# Patient Record
Sex: FEMALE | Race: WHITE | NOT HISPANIC OR LATINO | URBAN - METROPOLITAN AREA
[De-identification: names, ages, dates, MRNs, and addresses within clinical notes are randomized per-mention and may not be internally consistent; named-entity substitution may affect disease eponyms.]

---

## 2024-06-13 ENCOUNTER — APPOINTMENT (OUTPATIENT)
Dept: OBSTETRICS AND GYNECOLOGY | Facility: CLINIC | Age: 49
End: 2024-06-13
Payer: COMMERCIAL

## 2024-07-08 NOTE — PROGRESS NOTES
ASSESSMENT/PLAN    1. Encounter for well woman exam with routine gynecological exam  Routine well woman visit.   Your exam was normal today.   A pap and HPV test was done. If you are connected with the  on line system, you will be notified about your pap results through the patient portal in 2-3 weeks.      2. Encounter for screening mammogram for breast cancer  A screening mammogram requisition has been placed.   Please schedule your mammogram     3. Check FSH, menopausal status.  If menopausal will remove IUD.     SUBJECTIVE    PAP 9- ASCUS HPV NEG  MAMM 2- with ultrasound  DEXA NEVER  COLON 7-    HPI  48 yo for routine well woman visit.   Last visit 2017.   Last menses unsure.  Has Mirena IUD that I placed but is unsure of the year placed. No menopausal symptoms.  2021 mid urethral retropubic sling for incontinence.  Degenerative disc disease.   No other intervening medical or surgical issues.   works out [place]. 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  , nonsmoker.       REVIEW OF SYSTEMS    Constitutional: no fever, no chills, no recent weight gain, no recent weight loss, no fatigue.   Eyes: no eye pain, no vision problems, no dryness of the eyes.   ENT: no hearing loss, no nosebleeds, no sinus congestion.   Cardiovascular: no chest pain, no palpitations.  Respiratory: no shortness of breath, no cough, no wheezing.   Gastrointestinal: no abdominal pain, no constipation, no nausea, no diarrhea, no vomiting.   Genitourinary: no pelvic pain, no dysuria, no urinary incontinence, no change in urinary frequency, no vaginal dryness, no vaginal itching,  no vaginal discharge, no unexplained vaginal bleeding, no lesion/sore.   Musculoskeletal: no back pain, no joint swelling and no leg edema.   Integumentary: no rashes, no skin lesions, no breast pain, no nipple discharge, no breast lump.   Neurological: no headache, no numbness, no dizziness.   Psychiatric: no sleep  "disturbances, no anxiety, no depression.   Endocrine: no hot flashes, no loss of hair, no hirsutism.   Hematologic/Lymphatic: no swollen glands, no tendency for easy bleeding,  no tendency for easy bruising.      OBJECTIVE    /80   Ht 1.67 m (5' 5.75\")   Wt 85.7 kg (189 lb)   BMI 30.74 kg/m²      Physical Exam     Constitutional: Alert and in no acute distress. Well developed, well nourished   Head and Face: Head and face: normal   Eyes: Normal external exam - nonicteric sclera, extraocular movements intact (EOMI) and no ptosis.   Ears, Nose, Mouth, and Throat: External inspection of ears and nose: normal   Neck: no neck asymmetry. Supple and thyroid not enlarged and there were no palpable thyroid nodules   Cardiovascular: Heart rate and rhythm were normal, normal S1 and S2, no gallops, and no murmurs   Pulmonary: No respiratory distress and clear bilateral breath sounds   Chest: Breasts: normal appearance, no nipple discharge and no skin changes and palpation of breasts and axillae: no palpable mass and no axillary lymphadenopathy   Abdomen: soft nontender; no abdominal mass palpated, no organomegaly and no hernias   Genitourinary: external genitalia: normal, no inguinal lymphadenopathy, Bartholin's urethral and [place]'s glands: normal, urethra: normal, bladder: normal on palpation and perianal area: normal   Vagina: normal.   Cervix: Normal appearing without lesions. Strings at os. Bled with pap.  Uterus: Normal, mobile, nontender.  Right Adnexa/parametria: Normal.   Left Adnexa/parametria: Normal.   Skin: normal skin color and pigmentation, normal skin turgor, and no rash.   Psychiatric: alert and oriented x 3., affect normal to patient baseline and mood: appropriate        Sandi Baldwin MD    "

## 2024-07-09 ENCOUNTER — APPOINTMENT (OUTPATIENT)
Dept: OBSTETRICS AND GYNECOLOGY | Facility: CLINIC | Age: 49
End: 2024-07-09
Payer: COMMERCIAL

## 2024-07-09 ENCOUNTER — LAB (OUTPATIENT)
Dept: LAB | Facility: LAB | Age: 49
End: 2024-07-09
Payer: COMMERCIAL

## 2024-07-09 VITALS
SYSTOLIC BLOOD PRESSURE: 120 MMHG | DIASTOLIC BLOOD PRESSURE: 80 MMHG | WEIGHT: 189 LBS | HEIGHT: 66 IN | BODY MASS INDEX: 30.37 KG/M2

## 2024-07-09 DIAGNOSIS — Z12.4 CERVICAL CANCER SCREENING: ICD-10-CM

## 2024-07-09 DIAGNOSIS — Z13.29 SCREENING FOR ENDOCRINE, METABOLIC, AND IMMUNITY DISORDER: ICD-10-CM

## 2024-07-09 DIAGNOSIS — Z13.0 SCREENING FOR ENDOCRINE, METABOLIC, AND IMMUNITY DISORDER: ICD-10-CM

## 2024-07-09 DIAGNOSIS — Z13.228 SCREENING FOR ENDOCRINE, METABOLIC, AND IMMUNITY DISORDER: ICD-10-CM

## 2024-07-09 DIAGNOSIS — Z12.31 ENCOUNTER FOR SCREENING MAMMOGRAM FOR BREAST CANCER: ICD-10-CM

## 2024-07-09 DIAGNOSIS — Z01.419 ENCOUNTER FOR WELL WOMAN EXAM WITH ROUTINE GYNECOLOGICAL EXAM: Primary | ICD-10-CM

## 2024-07-09 LAB — FSH SERPL-ACNC: 8.3 IU/L

## 2024-07-09 PROCEDURE — 99386 PREV VISIT NEW AGE 40-64: CPT | Performed by: OBSTETRICS & GYNECOLOGY

## 2024-07-09 PROCEDURE — 88175 CYTOPATH C/V AUTO FLUID REDO: CPT

## 2024-07-09 PROCEDURE — 1036F TOBACCO NON-USER: CPT | Performed by: OBSTETRICS & GYNECOLOGY

## 2024-07-09 PROCEDURE — 36415 COLL VENOUS BLD VENIPUNCTURE: CPT

## 2024-07-09 PROCEDURE — 87624 HPV HI-RISK TYP POOLED RSLT: CPT

## 2024-07-09 PROCEDURE — 83001 ASSAY OF GONADOTROPIN (FSH): CPT

## 2024-07-09 RX ORDER — LEVONORGESTREL 52 MG/1
INTRAUTERINE DEVICE INTRAUTERINE
COMMUNITY

## 2024-07-09 RX ORDER — CHOLECALCIFEROL (VITAMIN D3) 25 MCG
2000 TABLET ORAL
COMMUNITY

## 2024-07-19 LAB
CYTOLOGY CMNT CVX/VAG CYTO-IMP: NORMAL
HPV HR 12 DNA GENITAL QL NAA+PROBE: NEGATIVE
HPV HR GENOTYPES PNL CVX NAA+PROBE: NEGATIVE
HPV16 DNA SPEC QL NAA+PROBE: NEGATIVE
HPV18 DNA SPEC QL NAA+PROBE: NEGATIVE
LAB AP CONTRACEPTIVE HISTORY: NORMAL
LAB AP HPV GENOTYPE QUESTION: YES
LAB AP HPV HR: NORMAL
LABORATORY COMMENT REPORT: NORMAL
PATH REPORT.TOTAL CANCER: NORMAL

## 2024-09-03 ENCOUNTER — APPOINTMENT (OUTPATIENT)
Dept: OBSTETRICS AND GYNECOLOGY | Facility: CLINIC | Age: 49
End: 2024-09-03
Payer: COMMERCIAL

## 2024-09-03 VITALS — SYSTOLIC BLOOD PRESSURE: 120 MMHG | BODY MASS INDEX: 30.9 KG/M2 | DIASTOLIC BLOOD PRESSURE: 70 MMHG | WEIGHT: 190 LBS

## 2024-09-03 DIAGNOSIS — Z30.430 ENCOUNTER FOR IUD INSERTION: ICD-10-CM

## 2024-09-03 DIAGNOSIS — Z32.02 PREGNANCY TEST NEGATIVE: ICD-10-CM

## 2024-09-03 DIAGNOSIS — Z30.433 ENCOUNTER FOR IUD REMOVAL AND REINSERTION: Primary | ICD-10-CM

## 2024-09-03 LAB — PREGNANCY TEST URINE, POC: NEGATIVE

## 2024-09-03 PROCEDURE — 58301 REMOVE INTRAUTERINE DEVICE: CPT | Performed by: OBSTETRICS & GYNECOLOGY

## 2024-09-03 PROCEDURE — 81025 URINE PREGNANCY TEST: CPT | Performed by: OBSTETRICS & GYNECOLOGY

## 2024-09-03 PROCEDURE — 58300 INSERT INTRAUTERINE DEVICE: CPT | Performed by: OBSTETRICS & GYNECOLOGY

## 2024-09-03 NOTE — PROGRESS NOTES
ASSESSMENT/PLAN  Encounter for IUD removal and reinsertion   Return to office one month for IUD check.    SUBJECTIVE    HPI    48 yo presents for Mirena IUD removal and reinsertion. Current IUD has been in eight years. Scanty occasional cycles with Mirena. Last seen 2024 for routine well woman visit. FSH not menopausal, 8.3.      OBJECTIVE    /70   Wt 86.2 kg (190 lb)   BMI 30.90 kg/m²      Physical Exam     Constitutional: Alert and in no acute distress. Well developed, well nourished   Genitourinary: external genitalia: normal. Cervix IUD strings at os.  Psychiatric: alert and oriented x 3., affect normal to patient baseline and mood: appropriate     IUD Removal    Performed by: Sandi Baldwin MD  Authorized by: Sandi Baldwin MD    Procedure: IUD removal and insertion    Consent obtained by patient, parent, or legal power of  - including discussion of procedure risks and benefits, patient questions answered, and patient education provided: yes    Other reason for removal:    Strings visualized: yes    Cervix cleaned with: iodopovidone    Tenaculum applied to cervix: yes    IUD grasped by forceps: yes    IUD removed: yes    Removed without complications: yes    IUD intact: yes    Uterus sound depth (cm):  8  Cervix manually dilated: no    IUD inserted without complications: yes    OSM: levonorgestrel 21 mcg/24 hr (8 yrs) 52 mg  Strings trimmed to (cm):  3  Patient tolerated procedure well: yes    Transvaginal sono confirmed fundal placement: Transabd ultrasound shows intrauterine IUD and was easily visualized..    Estimated blood loss (mL):  3  Intended removal date: 8 years           Sandi Baldwin MD

## 2024-09-30 NOTE — PROGRESS NOTES
ASSESSMENT/PLAN  IUD check up  RTO prn/routine well woman next year.    SUBJECTIVE    HPI    50 yo presents for IUD check.   S/p Mirena IUD removal and reinsertion 9/3/2024.   Not menopausal by FSH.    OBJECTIVE    /70   Wt 85.7 kg (189 lb)   BMI 30.74 kg/m²      Physical Exam     Constitutional: Alert and in no acute distress. Well developed, well nourished   Genitourinary: external genitalia: normal, perianal area: normal   Vagina: normal.   Cervix: Normal appearing without lesions. Strings at cervical os.  Psychiatric: alert and oriented x 3., affect normal to patient baseline and mood: appropriate       Sandi Baldwin MD

## 2024-10-01 ENCOUNTER — APPOINTMENT (OUTPATIENT)
Dept: OBSTETRICS AND GYNECOLOGY | Facility: CLINIC | Age: 49
End: 2024-10-01
Payer: COMMERCIAL

## 2024-10-01 VITALS — DIASTOLIC BLOOD PRESSURE: 70 MMHG | SYSTOLIC BLOOD PRESSURE: 124 MMHG | WEIGHT: 189 LBS | BODY MASS INDEX: 30.74 KG/M2

## 2024-10-01 DIAGNOSIS — Z30.431 IUD CHECK UP: Primary | ICD-10-CM

## 2024-10-01 PROCEDURE — 1036F TOBACCO NON-USER: CPT | Performed by: OBSTETRICS & GYNECOLOGY

## 2024-10-01 PROCEDURE — 99212 OFFICE O/P EST SF 10 MIN: CPT | Performed by: OBSTETRICS & GYNECOLOGY

## 2025-06-10 ENCOUNTER — APPOINTMENT (OUTPATIENT)
Dept: PRIMARY CARE | Facility: CLINIC | Age: 50
End: 2025-06-10
Payer: COMMERCIAL

## 2025-06-10 VITALS
RESPIRATION RATE: 16 BRPM | HEART RATE: 68 BPM | WEIGHT: 191 LBS | TEMPERATURE: 97.8 F | HEIGHT: 66 IN | OXYGEN SATURATION: 98 % | SYSTOLIC BLOOD PRESSURE: 132 MMHG | BODY MASS INDEX: 30.7 KG/M2 | DIASTOLIC BLOOD PRESSURE: 64 MMHG

## 2025-06-10 DIAGNOSIS — Z00.00 ENCOUNTER FOR ANNUAL PHYSICAL EXAM: ICD-10-CM

## 2025-06-10 DIAGNOSIS — Z12.31 ENCOUNTER FOR SCREENING MAMMOGRAM FOR BREAST CANCER: ICD-10-CM

## 2025-06-10 DIAGNOSIS — E11.9 DIET-CONTROLLED DIABETES MELLITUS (MULTI): Primary | ICD-10-CM

## 2025-06-10 DIAGNOSIS — E78.5 HYPERLIPIDEMIA, UNSPECIFIED HYPERLIPIDEMIA TYPE: ICD-10-CM

## 2025-06-10 PROCEDURE — 3078F DIAST BP <80 MM HG: CPT | Performed by: FAMILY MEDICINE

## 2025-06-10 PROCEDURE — 3075F SYST BP GE 130 - 139MM HG: CPT | Performed by: FAMILY MEDICINE

## 2025-06-10 PROCEDURE — G8433 SCR FOR DEP NOT CPT DOC RSN: HCPCS | Performed by: FAMILY MEDICINE

## 2025-06-10 PROCEDURE — 99386 PREV VISIT NEW AGE 40-64: CPT | Performed by: FAMILY MEDICINE

## 2025-06-10 PROCEDURE — 1036F TOBACCO NON-USER: CPT | Performed by: FAMILY MEDICINE

## 2025-06-10 PROCEDURE — 3008F BODY MASS INDEX DOCD: CPT | Performed by: FAMILY MEDICINE

## 2025-06-10 NOTE — PROGRESS NOTES
"Subjective   Roxanne Kunz is a 50 y.o. female who presents for New Patient Visit and degenerative disc.  HPI  PMH:  Urinary incont sp sling 2021   IUD 9/2024 Dr Baldwin   NIL neg HPV 7/2025   Neg cologuard 7/2023     Here to get est    Was told she was diabetic in the past but chose not to make medication changes or TLC. Now motivated to make TLC     Severe lower DDD. Seeing Dr Cervantes. Does stretches. Occ NSAIDs   R hip OA.     Migraines much better in the past yr.   Medications Ordered Prior to Encounter[1]               Objective   /64   Pulse 68   Temp 36.6 °C (97.8 °F)   Resp 16   Ht 1.67 m (5' 5.75\")   Wt 86.6 kg (191 lb)   SpO2 98%   BMI 31.06 kg/m²    Physical Exam  General: NAD  HEENT:NCAT, PERRLA, nml OP  Neck: no cervical LUIS  Heart: RRR no murmur, no edema   Lungs: CTA b/l, no wheeze or rhonchi   GI: abd soft, nontender, nondistended.   MSK: no c/c/e. nml gait   Skin: warm and dry  Psych: cooperative, appropriate affect  Neuro: speech clear. A&Ox3  Assessment/Plan   Problem List Items Addressed This Visit    None  Visit Diagnoses         Diet-controlled diabetes mellitus (Multi)    -  Primary  No A1c in 2 yr   Update labs   Prefers TLC >Rx         Encounter for annual physical exam      CPE:overall doing well. Discussed diet/ex changes  BMI: 31  VACC: reviewed declines tdap UTD  COLON CA SCRN: UTD  LABS: ordered  PAP: UTD  MAMMO: ordered  DEXA: 65      Relevant Orders    CBC and Auto Differential    Comprehensive Metabolic Panel    Lipid Panel    TSH with reflex to Free T4 if abnormal    Hemoglobin A1C      Hyperlipidemia, unspecified hyperlipidemia type      Borderline HLD in past  Check CAC       Relevant Orders    CT cardiac scoring wo IV contrast      Encounter for screening mammogram for breast cancer        Relevant Orders    BI mammo bilateral screening tomosynthesis                 [1]   Current Outpatient Medications on File Prior to Visit   Medication Sig Dispense Refill    " levonorgestrel (Mirena) 21 mcg/24 hr (8 yrs) 52 mg IUD by intrauterine route.      cholecalciferol (Vitamin D-3) 25 MCG (1000 UT) tablet Take 2 tablets (2,000 Units) by mouth once daily.       Current Facility-Administered Medications on File Prior to Visit   Medication Dose Route Frequency Provider Last Rate Last Admin    levonorgestrel (Mirena) 21 mcg/24 hr (8 yrs) 52 mg IUD   intrauterine Once Sandi Baldwin MD

## 2025-06-11 ENCOUNTER — TELEPHONE (OUTPATIENT)
Dept: PRIMARY CARE | Facility: CLINIC | Age: 50
End: 2025-06-11
Payer: COMMERCIAL

## 2025-06-11 LAB
ALBUMIN SERPL-MCNC: 4.3 G/DL (ref 3.6–5.1)
ALP SERPL-CCNC: 60 U/L (ref 37–153)
ALT SERPL-CCNC: 41 U/L (ref 6–29)
ANION GAP SERPL CALCULATED.4IONS-SCNC: 8 MMOL/L (CALC) (ref 7–17)
AST SERPL-CCNC: 38 U/L (ref 10–35)
BASOPHILS # BLD AUTO: 29 CELLS/UL (ref 0–200)
BASOPHILS NFR BLD AUTO: 0.5 %
BILIRUB SERPL-MCNC: 0.7 MG/DL (ref 0.2–1.2)
BUN SERPL-MCNC: 9 MG/DL (ref 7–25)
CALCIUM SERPL-MCNC: 9 MG/DL (ref 8.6–10.4)
CHLORIDE SERPL-SCNC: 102 MMOL/L (ref 98–110)
CHOLEST SERPL-MCNC: 240 MG/DL
CHOLEST/HDLC SERPL: 5.5 (CALC)
CO2 SERPL-SCNC: 28 MMOL/L (ref 20–32)
CREAT SERPL-MCNC: 0.81 MG/DL (ref 0.5–1.03)
EGFRCR SERPLBLD CKD-EPI 2021: 88 ML/MIN/1.73M2
EOSINOPHIL # BLD AUTO: 99 CELLS/UL (ref 15–500)
EOSINOPHIL NFR BLD AUTO: 1.7 %
ERYTHROCYTE [DISTWIDTH] IN BLOOD BY AUTOMATED COUNT: 11.9 % (ref 11–15)
EST. AVERAGE GLUCOSE BLD GHB EST-MCNC: 226 MG/DL
EST. AVERAGE GLUCOSE BLD GHB EST-SCNC: 12.5 MMOL/L
GLUCOSE SERPL-MCNC: 206 MG/DL (ref 65–99)
HBA1C MFR BLD: 9.5 %
HCT VFR BLD AUTO: 43.6 % (ref 35–45)
HDLC SERPL-MCNC: 44 MG/DL
HGB BLD-MCNC: 14.1 G/DL (ref 11.7–15.5)
LDLC SERPL CALC-MCNC: 159 MG/DL (CALC)
LYMPHOCYTES # BLD AUTO: 1978 CELLS/UL (ref 850–3900)
LYMPHOCYTES NFR BLD AUTO: 34.1 %
MCH RBC QN AUTO: 31.3 PG (ref 27–33)
MCHC RBC AUTO-ENTMCNC: 32.3 G/DL (ref 32–36)
MCV RBC AUTO: 96.9 FL (ref 80–100)
MONOCYTES # BLD AUTO: 342 CELLS/UL (ref 200–950)
MONOCYTES NFR BLD AUTO: 5.9 %
NEUTROPHILS # BLD AUTO: 3352 CELLS/UL (ref 1500–7800)
NEUTROPHILS NFR BLD AUTO: 57.8 %
NONHDLC SERPL-MCNC: 196 MG/DL (CALC)
PLATELET # BLD AUTO: 221 THOUSAND/UL (ref 140–400)
PMV BLD REES-ECKER: 10.6 FL (ref 7.5–12.5)
POTASSIUM SERPL-SCNC: 4.4 MMOL/L (ref 3.5–5.3)
PROT SERPL-MCNC: 6.5 G/DL (ref 6.1–8.1)
RBC # BLD AUTO: 4.5 MILLION/UL (ref 3.8–5.1)
SODIUM SERPL-SCNC: 138 MMOL/L (ref 135–146)
TRIGL SERPL-MCNC: 221 MG/DL
TSH SERPL-ACNC: 1.02 MIU/L
WBC # BLD AUTO: 5.8 THOUSAND/UL (ref 3.8–10.8)

## 2025-06-11 NOTE — TELEPHONE ENCOUNTER
Sugar is significantly elevated in a diabetic range   I know she wanted to make lifestyle changes first but based off these levels I am concerned about diabetes causing damage to her body if left untreated     Liver tests also elevated which is commonly d/t stress on the liver from diabetes    Need to [facility]ott appt to discuss next steps in the next 2-4 wk

## 2025-06-18 ENCOUNTER — OFFICE VISIT (OUTPATIENT)
Dept: URGENT CARE | Age: 50
End: 2025-06-18
Payer: COMMERCIAL

## 2025-06-18 VITALS
DIASTOLIC BLOOD PRESSURE: 74 MMHG | TEMPERATURE: 98.1 F | RESPIRATION RATE: 16 BRPM | HEART RATE: 68 BPM | HEIGHT: 65 IN | BODY MASS INDEX: 31.82 KG/M2 | WEIGHT: 191 LBS | OXYGEN SATURATION: 97 % | SYSTOLIC BLOOD PRESSURE: 120 MMHG

## 2025-06-18 DIAGNOSIS — L25.5 CONTACT DERMATITIS DUE TO PLANTS, EXCEPT FOOD, UNSPECIFIED CONTACT DERMATITIS TYPE: Primary | ICD-10-CM

## 2025-06-18 PROCEDURE — 3008F BODY MASS INDEX DOCD: CPT

## 2025-06-18 PROCEDURE — 99204 OFFICE O/P NEW MOD 45 MIN: CPT

## 2025-06-18 PROCEDURE — 1036F TOBACCO NON-USER: CPT

## 2025-06-18 RX ORDER — TRIAMCINOLONE ACETONIDE 1 MG/G
CREAM TOPICAL
Qty: 30 G | Refills: 0 | Status: SHIPPED | OUTPATIENT
Start: 2025-06-18 | End: 2025-08-17

## 2025-06-18 RX ORDER — PREDNISONE 10 MG/1
TABLET ORAL
Qty: 30 TABLET | Refills: 0 | Status: SHIPPED | OUTPATIENT
Start: 2025-06-18 | End: 2025-06-30

## 2025-06-18 NOTE — PATIENT INSTRUCTIONS
You were seen at Urgent Care today for ongoing, worsening poison ivy. Please treat as discussed. Please take medications as prescribed. Monitor for red flags which we spoke about, If your symptoms change, worsen or become concerning in any way, please go to the emergency room immediately, otherwise you can followup with your PCP in 2-3 days as needed

## 2025-06-18 NOTE — PROGRESS NOTES
"Subjective   Patient ID: Roxanne Kunz is a 50 y.o. female. They present today with a chief complaint of Poison Ivy (Poison ivy starting 6/10).    History of Present Illness  Patient is a 50-year-old female with hyperlipidemia and GERD who presents urgent care today with a complaint of ongoing, worsening poison ivy.  She states the rash originally started on her right arm but has unfortunately spread to all of her extremities despite using various over-the-counter medications.  She denies any fevers, chills, nausea, vomiting or any other symptoms.      History provided by:  Patient  Poison Zarina  Associated symptoms: rash        Past Medical History  Allergies as of 06/18/2025    (No Known Allergies)       Prescriptions Prior to Admission[1]       Medical History[2]    Surgical History[3]     reports that she has never smoked. She has never used smokeless tobacco. She reports that she does not currently use alcohol. She reports that she does not use drugs.    Review of Systems  Review of Systems   Skin:  Positive for rash.                                  Objective    Vitals:    06/18/25 0809   BP: 120/74   BP Location: Left arm   Patient Position: Sitting   BP Cuff Size: Adult   Pulse: 68   Resp: 16   Temp: 36.7 °C (98.1 °F)   TempSrc: Oral   SpO2: 97%   Weight: 86.6 kg (191 lb)   Height: 1.651 m (5' 5\")     No LMP recorded. (Menstrual status: IUD).    Physical Exam  Vitals and nursing note reviewed.   Constitutional:       General: She is not in acute distress.     Appearance: Normal appearance. She is not ill-appearing, toxic-appearing or diaphoretic.   HENT:      Head: Normocephalic and atraumatic.      Mouth/Throat:      Mouth: Mucous membranes are moist.   Eyes:      Extraocular Movements: Extraocular movements intact.      Conjunctiva/sclera: Conjunctivae normal.      Pupils: Pupils are equal, round, and reactive to light.   Cardiovascular:      Rate and Rhythm: Normal rate and regular rhythm.      Pulses: Normal " pulses.      Heart sounds: Normal heart sounds.   Pulmonary:      Effort: Pulmonary effort is normal. No respiratory distress.      Breath sounds: Normal breath sounds. No stridor. No wheezing, rhonchi or rales.   Chest:      Chest wall: No tenderness.   Musculoskeletal:         General: Normal range of motion.      Cervical back: Normal range of motion and neck supple.   Skin:     General: Skin is warm and dry.      Capillary Refill: Capillary refill takes less than 2 seconds.      Findings: Rash present.   Neurological:      General: No focal deficit present.      Mental Status: She is alert and oriented to person, place, and time.   Psychiatric:         Mood and Affect: Mood normal.         Behavior: Behavior normal.         Procedures      Assessment/Plan   Allergies, medications, history, and pertinent labs/EKGs/Imaging reviewed by ANH Gallagher.     Medical Decision Making    Patient is well appearing, afebrile, non toxic, not hypoxic, and appropriate for outpatient treatment and management at time of evaluation. Patient presents with ongoing, worsening pruritic rash.     Differential includes but not limited to: Contact dermatitis, allergic reaction, cellulitis, other    On exam, patient has erythematous, macular/papular, pruritic rash scattered diffusely on all extremities.  No induration or fluctuance.  Some of the areas appear to be vesicular with scant amount of serous drainage.  No open sores or areas of skin breakdown.  History exam consistent with contact dermatitis.  Patient was provided with a prescription for triamcinolone cream and prednisone to use as directed.  She was given the opportunity ask questions.    Discussed return precautions and importance of follow-up. Advised to follow-up with PCP.  We spoke at length regarding the red flags he/she should be aware of and monitor for.  I specifically advised to go to the ED for changing or worsening symptoms, new symptoms, complaint  specific precautions, and precautions listed on the discharge paperwork.    The plan of care was mutually agreed upon with the patient. All questions and concerns were answered to the best of my ability with today's information.  Patient was discharged in stable condition.    Dictation software was used in the creation of this note which does not evaluate or correct for typographical, spelling, syntax or grammatical errors.    Orders and Diagnoses  Diagnoses and all orders for this visit:  Contact dermatitis due to plants, except food, unspecified contact dermatitis type  -     triamcinolone (Kenalog) 0.1 % cream; Apply to affected area 1-2 times daily as needed.  -     predniSONE (Deltasone) 10 mg tablet; Take 4 tablets (40 mg) by mouth once daily for 3 days, THEN 3 tablets (30 mg) once daily for 3 days, THEN 2 tablets (20 mg) once daily for 3 days, THEN 1 tablet (10 mg) once daily for 3 days.      Medical Admin Record      Follow Up Instructions  No follow-ups on file.    Patient disposition: Home    Electronically signed by ANH Gallagher  8:23 AM       [1] (Not in a hospital admission)  [2]   Past Medical History:  Diagnosis Date    Encounter for full-term uncomplicated delivery      (spontaneous vaginal delivery)    Personal history of other diseases of the respiratory system     Personal history of asthma    Personal history of pneumonia (recurrent)     History of pneumonia    Renal tubulo-interstitial disease, unspecified     Infection, kidney    Varicella without complication     Varicella without complication   [3]   Past Surgical History:  Procedure Laterality Date    MOUTH SURGERY  2017    Oral Surgery Tooth Extraction    TONSILLECTOMY  2017    Tonsillectomy

## 2025-06-25 ENCOUNTER — APPOINTMENT (OUTPATIENT)
Dept: PRIMARY CARE | Facility: CLINIC | Age: 50
End: 2025-06-25
Payer: COMMERCIAL

## 2025-06-25 ENCOUNTER — HOSPITAL ENCOUNTER (OUTPATIENT)
Dept: RADIOLOGY | Facility: EXTERNAL LOCATION | Age: 50
Discharge: HOME | End: 2025-06-25

## 2025-06-25 ENCOUNTER — HOSPITAL ENCOUNTER (OUTPATIENT)
Dept: RADIOLOGY | Facility: HOSPITAL | Age: 50
Discharge: HOME | End: 2025-06-25
Payer: COMMERCIAL

## 2025-06-25 VITALS
TEMPERATURE: 98.6 F | SYSTOLIC BLOOD PRESSURE: 128 MMHG | RESPIRATION RATE: 16 BRPM | WEIGHT: 183 LBS | HEART RATE: 93 BPM | DIASTOLIC BLOOD PRESSURE: 70 MMHG | HEIGHT: 65 IN | OXYGEN SATURATION: 97 % | BODY MASS INDEX: 30.49 KG/M2

## 2025-06-25 VITALS — WEIGHT: 191 LBS | BODY MASS INDEX: 31.82 KG/M2 | HEIGHT: 65 IN

## 2025-06-25 DIAGNOSIS — R79.89 ELEVATED LFTS: ICD-10-CM

## 2025-06-25 DIAGNOSIS — Z12.31 ENCOUNTER FOR SCREENING MAMMOGRAM FOR BREAST CANCER: ICD-10-CM

## 2025-06-25 DIAGNOSIS — E78.5 HYPERLIPIDEMIA, UNSPECIFIED HYPERLIPIDEMIA TYPE: ICD-10-CM

## 2025-06-25 DIAGNOSIS — E11.9 TYPE 2 DIABETES MELLITUS WITHOUT COMPLICATION, WITHOUT LONG-TERM CURRENT USE OF INSULIN: Primary | ICD-10-CM

## 2025-06-25 DIAGNOSIS — L23.7 POISON IVY: ICD-10-CM

## 2025-06-25 PROCEDURE — 77063 BREAST TOMOSYNTHESIS BI: CPT | Performed by: STUDENT IN AN ORGANIZED HEALTH CARE EDUCATION/TRAINING PROGRAM

## 2025-06-25 PROCEDURE — 77067 SCR MAMMO BI INCL CAD: CPT | Performed by: STUDENT IN AN ORGANIZED HEALTH CARE EDUCATION/TRAINING PROGRAM

## 2025-06-25 PROCEDURE — 1036F TOBACCO NON-USER: CPT | Performed by: FAMILY MEDICINE

## 2025-06-25 PROCEDURE — 3008F BODY MASS INDEX DOCD: CPT | Performed by: FAMILY MEDICINE

## 2025-06-25 PROCEDURE — 3074F SYST BP LT 130 MM HG: CPT | Performed by: FAMILY MEDICINE

## 2025-06-25 PROCEDURE — 77067 SCR MAMMO BI INCL CAD: CPT

## 2025-06-25 PROCEDURE — 3078F DIAST BP <80 MM HG: CPT | Performed by: FAMILY MEDICINE

## 2025-06-25 PROCEDURE — 99214 OFFICE O/P EST MOD 30 MIN: CPT | Performed by: FAMILY MEDICINE

## 2025-06-25 RX ORDER — LANCETS
1 EACH MISCELLANEOUS DAILY
Qty: 50 EACH | Refills: 11 | Status: SHIPPED | OUTPATIENT
Start: 2025-06-25

## 2025-06-25 RX ORDER — INSULIN PUMP SYRINGE, 3 ML
EACH MISCELLANEOUS
Qty: 1 EACH | Refills: 11 | Status: SHIPPED | OUTPATIENT
Start: 2025-06-25 | End: 2026-06-25

## 2025-06-25 RX ORDER — TIRZEPATIDE 2.5 MG/.5ML
2.5 INJECTION, SOLUTION SUBCUTANEOUS WEEKLY
Qty: 4 PEN | Refills: 11 | Status: SHIPPED | OUTPATIENT
Start: 2025-06-25

## 2025-06-25 NOTE — PROGRESS NOTES
..........................................................................................................................................................................................................................................................................................................................................................................................................................

## 2025-06-25 NOTE — PROGRESS NOTES
"Carlton Kunz is a 50 y.o. female who presents for Follow-up (Physical documents need review and sign).  HPI    Here for f/up on labs A1c much inc from 6.9 2 yr ago to 9.4. took metformin briefly but hard to remember to take and parents had kidney issues which she was concerned metformin caused     Had glu in urine at work     On pred for poison ivy w inc thirst/urination but prior no polyuria/polydipsia.       Medications Ordered Prior to Encounter[1]               Objective   /70   Pulse 93   Temp 37 °C (98.6 °F)   Resp 16   Ht 1.651 m (5' 5\")   Wt 83 kg (183 lb)   SpO2 97%   BMI 30.45 kg/m²    Physical Exam  General: NAD  HEENT:NCAT, PERRLA, nml OP  Neck: no cervical LUIS  Heart: RRR no murmur, no edema   Lungs: CTA b/l, no wheeze or rhonchi   MSK: no c/c/e. nml gait   Skin: warm and dry  Crusted healing lesions on UE/LE   Psych: cooperative, appropriate affect  Neuro: speech clear. A&Ox3  Assessment/Plan   Problem List Items Addressed This Visit       Type 2 diabetes mellitus without complication, without long-term current use of insulin - Primary  UNCONTROLLED  Start mounjaro 2.5, samples provided   Start checkings fasting glu   RF pharm   Ideally needs statin/acei but will intro one Rx at a time.       Relevant Medications    tirzepatide (Mounjaro) 2.5 mg/0.5 mL pen injector    blood sugar diagnostic    Blood glucose monitoring meter    lancets misc    Other Relevant Orders    Referral to Clinical Pharmacy     Other Visit Diagnoses         Hyperlipidemia, unspecified hyperlipidemia type      Upcoming CAC   Ideally needs statin d/t DM2   Hesistant to take Rx   Will address at f/up         Elevated LFTs      Likely d/t uncontrolled DM2   Rpt in 3 mo        Poison ivy      Improving w pred                    [1]   Current Outpatient Medications on File Prior to Visit   Medication Sig Dispense Refill    levonorgestrel (Mirena) 21 mcg/24 hr (8 yrs) 52 mg IUD by intrauterine route.      " predniSONE (Deltasone) 10 mg tablet Take 4 tablets (40 mg) by mouth once daily for 3 days, THEN 3 tablets (30 mg) once daily for 3 days, THEN 2 tablets (20 mg) once daily for 3 days, THEN 1 tablet (10 mg) once daily for 3 days. 30 tablet 0    triamcinolone (Kenalog) 0.1 % cream Apply to affected area 1-2 times daily as needed. 30 g 0     Current Facility-Administered Medications on File Prior to Visit   Medication Dose Route Frequency Provider Last Rate Last Admin    levonorgestrel (Mirena) 21 mcg/24 hr (8 yrs) 52 mg IUD   intrauterine Once Sandi Baldwin MD

## 2025-07-02 ENCOUNTER — APPOINTMENT (OUTPATIENT)
Dept: PHARMACY | Facility: HOSPITAL | Age: 50
End: 2025-07-02
Payer: COMMERCIAL

## 2025-07-02 DIAGNOSIS — E11.9 TYPE 2 DIABETES MELLITUS WITHOUT COMPLICATION, WITHOUT LONG-TERM CURRENT USE OF INSULIN: ICD-10-CM

## 2025-07-02 RX ORDER — BISMUTH SUBSALICYLATE 262 MG
1 TABLET,CHEWABLE ORAL DAILY
COMMUNITY

## 2025-07-02 NOTE — PROGRESS NOTES
"  Clinical Pharmacy Appointment    Patient ID: Roxanne Kunz is a 50 y.o. female who presents for Diabetes.    Pt is here for First appointment.     Referring Provider: Behm, Brittany, DO  PCP: Brittany Behm, DO   Last visit with PCP: 6/25/25   Next visit with PCP: 7/28/25      Subjective     Medication Reconciliation:  Changed: None  Added: Ashwagandha, multivitamin  Discontinued: prednisone, triamcinolone     Drug Interactions  No relevant drug interactions were noted.    Medication System Management  Patient's preferred pharmacy: [facility]  Adherence/Organization: No concerns  Affordability/Accessibility: No concerns  Pharmacist on care team: Yes    HPI    DIABETES ASSESSMENT  Current Medications:   Mounjaro 2.5 mg weekly (approved/received but has not started yet due to travel)    Side Effects: N/A    Previous Medications:  Metformin (prescribed but never took)  Her parents had kidney issues which she was concerned metformin caused    ACEI/ARB: No  Statin: No   Aspirin: No    Home BG monitoring: Recently prescribed new testing supplies but has not received yet. Needs to communicate with local pharmacy.    Lifestyle:   Diet: Admits to poor diet and enjoys sweets. Completely cut out Mountain Dew and sweet tea (previously could drink up to 1 pitcher per day of sweet tea). Trying to cut back on white starches and potatoes.  Exercise: Walking.    Diabetes History:   Diagnosed with diabetes: ~2023. Known diabetic complications: No.  Does patient follow with Endocrinology: No  Last optometry exam: Needs to get one  Last foot exam: No concerns    Pertinent PMH Review:  PMH MI/stroke/vascular disease: No  PMH of Pancreatitis: No  PMH of Retinopathy: No  PMH of Urinary Tract Infections: No  Personal/family PMH of MTC: No  PMH of MEN2: No  UACR/EGFR in last year?: No  No results found for: \"MICROALBCREA\"    Objective   Allergies[1]  Social History     Social History Narrative    Not on file      Medication Review  Current " "Outpatient Medications   Medication Instructions    ASHWAGANDHA EXTRACT ORAL 2 each, Daily    Blood glucose monitoring meter Check glucose daily    blood sugar diagnostic 1 strip, miscellaneous, Daily    lancets misc 1 Units, miscellaneous, Daily    levonorgestrel (Mirena) 21 mcg/24 hr (8 yrs) 52 mg IUD by intrauterine route.    Mounjaro 2.5 mg, subcutaneous, Weekly    multivitamin tablet 1 tablet, Daily      Vitals  BP Readings from Last 3 Encounters:   06/25/25 128/70   06/18/25 120/74   06/10/25 132/64     Labs  A1C  Lab Results   Component Value Date    HGBA1C 9.5 (H) 06/10/2025    HGBA1C 6.9 (H) 06/21/2023    HGBA1C 6.7 (H) 12/16/2021     BMP  Lab Results   Component Value Date    GLUCOSE 206 (H) 06/10/2025    CALCIUM 9.0 06/10/2025     06/10/2025    K 4.4 06/10/2025    CO2 28 06/10/2025     06/10/2025    BUN 9 06/10/2025    CREATININE 0.81 06/10/2025    EGFR 88 06/10/2025     LFTs  Lab Results   Component Value Date    ALT 41 (H) 06/10/2025    AST 38 (H) 06/10/2025    ALKPHOS 60 06/10/2025    BILITOT 0.7 06/10/2025     FLP  Lab Results   Component Value Date    TRIG 221 (H) 06/10/2025    CHOL 240 (H) 06/10/2025    LDLCALC 159 (H) 06/10/2025    HDL 44 (L) 06/10/2025     ASCVD Score  The 10-year ASCVD risk score (Esme STEEL, et al., 2019) is: 4.3%    Values used to calculate the score:      Age: 50 years      Sex: Female      Is Non- : No      Diabetic: Yes      Tobacco smoker: No      Systolic Blood Pressure: 128 mmHg      Is BP treated: No      HDL Cholesterol: 44 mg/dL      Total Cholesterol: 240 mg/dL  Urine Microalbumin  No results found for: \"MICROALBCREA\"  Weight Management  Wt Readings from Last 3 Encounters:   06/25/25 83 kg (183 lb)   06/25/25 86.6 kg (191 lb)   06/18/25 86.6 kg (191 lb)      BMI Readings from Last 3 Encounters:   06/25/25 30.45 kg/m²   06/25/25 31.78 kg/m²   06/18/25 31.78 kg/m²        Assessment/Plan   Problem List Items Addressed This Visit       " Type 2 diabetes mellitus without complication, without long-term current use of insulin    Relevant Orders    Albumin-Creatinine Ratio, Urine Random    Referral to Clinical Pharmacy   Last A1c of 9.5% (6/10/25). Goal A1c of <7%. Patient is not currently monitoring blood sugar readings at home to evaluate. Patient to contact local pharmacy regarding status of testing supplies. Goal to test fasting blood sugars at least a few times per week but welcome to check daily. Discussed that likely would need another medication added at the same time as Mounjaro but patient is making major lifestyle changes. Will proceed with plan to start Mounjaro.    DM Medication Plan:   START Mounjaro 2.5 mg weekly   Plans to start Saturday, 7/5/25    Secondary prevention:  ACE/ARB: No, will order microalbumin/creatinine ratio  Statin therapy: No, defer until after Mounjaro     Clinical Pharmacist follow-up: 1 month, Telehealth visit    Future Appointments   Date Time Provider Department Center   7/28/2025 11:00 AM Brittany Behm, DO [id number] [facility]   8/13/2025  1:40 PM Tram Dickens V PharmVINCE [id number] [facility]   8/19/2025  7:00 AM GEA CT 2 [facility]   6/11/2026  8:00 AM Brittany Behm, DO [id number] [facility]       Continue all meds under the continuation of care with the referring provider and clinical pharmacy team.    Thank you,  Tram Dickens  Clinical Pharmacist  [phone]    Verbal consent to manage patient's drug therapy was obtained from the patient. They were informed they may decline to participate or withdraw from participation in pharmacy services at any time.         [1] No Known Allergies

## 2025-07-28 ENCOUNTER — APPOINTMENT (OUTPATIENT)
Dept: PRIMARY CARE | Facility: CLINIC | Age: 50
End: 2025-07-28
Payer: COMMERCIAL

## 2025-07-30 ENCOUNTER — APPOINTMENT (OUTPATIENT)
Dept: PRIMARY CARE | Facility: CLINIC | Age: 50
End: 2025-07-30
Payer: COMMERCIAL

## 2025-08-13 ENCOUNTER — APPOINTMENT (OUTPATIENT)
Dept: PHARMACY | Facility: HOSPITAL | Age: 50
End: 2025-08-13
Payer: COMMERCIAL

## 2025-08-19 ENCOUNTER — HOSPITAL ENCOUNTER (OUTPATIENT)
Dept: RADIOLOGY | Facility: HOSPITAL | Age: 50
Discharge: HOME | End: 2025-08-19
Payer: COMMERCIAL

## 2025-08-19 DIAGNOSIS — E78.5 HYPERLIPIDEMIA, UNSPECIFIED HYPERLIPIDEMIA TYPE: ICD-10-CM

## 2025-08-19 PROCEDURE — 75571 CT HRT W/O DYE W/CA TEST: CPT

## 2025-08-22 ENCOUNTER — CLINICAL SUPPORT (OUTPATIENT)
Dept: PHARMACY | Facility: HOSPITAL | Age: 50
End: 2025-08-22
Payer: COMMERCIAL

## 2025-08-22 VITALS — BODY MASS INDEX: 29.05 KG/M2 | WEIGHT: 174.6 LBS

## 2025-08-22 DIAGNOSIS — E11.9 TYPE 2 DIABETES MELLITUS WITHOUT COMPLICATION, WITHOUT LONG-TERM CURRENT USE OF INSULIN: ICD-10-CM

## 2025-08-22 RX ORDER — TIRZEPATIDE 5 MG/.5ML
5 INJECTION, SOLUTION SUBCUTANEOUS WEEKLY
Qty: 2 ML | Refills: 11 | Status: SHIPPED | OUTPATIENT
Start: 2025-08-22

## 2025-08-25 DIAGNOSIS — E11.9 TYPE 2 DIABETES MELLITUS WITHOUT COMPLICATION, WITHOUT LONG-TERM CURRENT USE OF INSULIN: ICD-10-CM

## 2025-08-28 ENCOUNTER — APPOINTMENT (OUTPATIENT)
Dept: PRIMARY CARE | Facility: CLINIC | Age: 50
End: 2025-08-28
Payer: COMMERCIAL

## 2025-08-28 VITALS
RESPIRATION RATE: 16 BRPM | HEART RATE: 67 BPM | SYSTOLIC BLOOD PRESSURE: 128 MMHG | DIASTOLIC BLOOD PRESSURE: 76 MMHG | WEIGHT: 175 LBS | HEIGHT: 65 IN | BODY MASS INDEX: 29.16 KG/M2 | OXYGEN SATURATION: 98 % | TEMPERATURE: 97.8 F

## 2025-08-28 DIAGNOSIS — E78.5 HYPERLIPIDEMIA, UNSPECIFIED HYPERLIPIDEMIA TYPE: ICD-10-CM

## 2025-08-28 DIAGNOSIS — R79.89 ELEVATED LFTS: ICD-10-CM

## 2025-08-28 DIAGNOSIS — E11.9 TYPE 2 DIABETES MELLITUS WITHOUT COMPLICATION, WITHOUT LONG-TERM CURRENT USE OF INSULIN: Primary | ICD-10-CM

## 2025-08-28 PROCEDURE — 3008F BODY MASS INDEX DOCD: CPT | Performed by: FAMILY MEDICINE

## 2025-08-28 PROCEDURE — 1036F TOBACCO NON-USER: CPT | Performed by: FAMILY MEDICINE

## 2025-08-28 PROCEDURE — 3074F SYST BP LT 130 MM HG: CPT | Performed by: FAMILY MEDICINE

## 2025-08-28 PROCEDURE — 99214 OFFICE O/P EST MOD 30 MIN: CPT | Performed by: FAMILY MEDICINE

## 2025-08-28 PROCEDURE — 3078F DIAST BP <80 MM HG: CPT | Performed by: FAMILY MEDICINE

## 2025-08-28 ASSESSMENT — PATIENT HEALTH QUESTIONNAIRE - PHQ9
2. FEELING DOWN, DEPRESSED OR HOPELESS: NOT AT ALL
1. LITTLE INTEREST OR PLEASURE IN DOING THINGS: NOT AT ALL
SUM OF ALL RESPONSES TO PHQ9 QUESTIONS 1 AND 2: 0

## 2025-08-29 LAB
ALBUMIN/CREAT UR: 4 MG/G CREAT
CREAT UR-MCNC: 183 MG/DL (ref 20–275)
MICROALBUMIN UR-MCNC: 0.7 MG/DL

## 2025-10-08 ENCOUNTER — APPOINTMENT (OUTPATIENT)
Dept: PRIMARY CARE | Facility: CLINIC | Age: 50
End: 2025-10-08
Payer: COMMERCIAL

## 2025-10-22 ENCOUNTER — APPOINTMENT (OUTPATIENT)
Dept: PHARMACY | Facility: HOSPITAL | Age: 50
End: 2025-10-22
Payer: COMMERCIAL

## 2026-06-11 ENCOUNTER — APPOINTMENT (OUTPATIENT)
Dept: PRIMARY CARE | Facility: CLINIC | Age: 51
End: 2026-06-11
Payer: COMMERCIAL